# Patient Record
Sex: MALE | URBAN - METROPOLITAN AREA
[De-identification: names, ages, dates, MRNs, and addresses within clinical notes are randomized per-mention and may not be internally consistent; named-entity substitution may affect disease eponyms.]

---

## 2023-02-23 ENCOUNTER — APPOINTMENT (RX ONLY)
Dept: URBAN - METROPOLITAN AREA CLINIC 133 | Facility: CLINIC | Age: 28
Setting detail: DERMATOLOGY
End: 2023-02-23

## 2023-02-23 DIAGNOSIS — L21.8 OTHER SEBORRHEIC DERMATITIS: ICD-10-CM

## 2023-02-23 PROCEDURE — ? PRESCRIPTION

## 2023-02-23 PROCEDURE — 99203 OFFICE O/P NEW LOW 30 MIN: CPT

## 2023-02-23 PROCEDURE — ? COUNSELING

## 2023-02-23 PROCEDURE — ? TREATMENT REGIMEN

## 2023-02-23 RX ORDER — CLOBETASOL PROPIONATE 0.5 MG/G
GEL TOPICAL
Qty: 30 | Refills: 2 | Status: ERX | COMMUNITY
Start: 2023-02-23

## 2023-02-23 RX ADMIN — CLOBETASOL PROPIONATE: 0.5 GEL TOPICAL at 00:00

## 2023-02-23 ASSESSMENT — LOCATION DETAILED DESCRIPTION DERM
LOCATION DETAILED: RIGHT POSTERIOR NECK
LOCATION DETAILED: LEFT MEDIAL TRAPEZIAL NECK

## 2023-02-23 ASSESSMENT — LOCATION SIMPLE DESCRIPTION DERM: LOCATION SIMPLE: POSTERIOR NECK

## 2023-02-23 ASSESSMENT — LOCATION ZONE DERM: LOCATION ZONE: NECK

## 2023-02-23 NOTE — HPI: RASH
What Type Of Note Output Would You Prefer (Optional)?: Standard Output
How Severe Is Your Rash?: moderate
Is This A New Presentation, Or A Follow-Up?: Rash
Additional History: Patient states this started on his back, has now moved up his neck. Tried Selsun blue for area, since his aunt stayed his cousin had a similar issue and that helped, leaves on for a few minutes prior to washing off.  Patient states this brings some relief but drys skin out a lot.

## 2023-04-19 ENCOUNTER — APPOINTMENT (RX ONLY)
Dept: URBAN - METROPOLITAN AREA CLINIC 133 | Facility: CLINIC | Age: 28
Setting detail: DERMATOLOGY
End: 2023-04-19

## 2023-04-19 VITALS — HEIGHT: 76 IN | WEIGHT: 245 LBS

## 2023-04-19 DIAGNOSIS — L21.8 OTHER SEBORRHEIC DERMATITIS: ICD-10-CM | Status: IMPROVED

## 2023-04-19 DIAGNOSIS — L91.8 OTHER HYPERTROPHIC DISORDERS OF THE SKIN: ICD-10-CM

## 2023-04-19 DIAGNOSIS — B07.8 OTHER VIRAL WARTS: ICD-10-CM

## 2023-04-19 PROCEDURE — ? TREATMENT REGIMEN

## 2023-04-19 PROCEDURE — ? LIQUID NITROGEN

## 2023-04-19 PROCEDURE — ? ADDITIONAL NOTES

## 2023-04-19 PROCEDURE — 17110 DESTRUCTION B9 LES UP TO 14: CPT

## 2023-04-19 PROCEDURE — ? COUNSELING

## 2023-04-19 PROCEDURE — 99213 OFFICE O/P EST LOW 20 MIN: CPT | Mod: 25

## 2023-04-19 ASSESSMENT — LOCATION DETAILED DESCRIPTION DERM
LOCATION DETAILED: LEFT MEDIAL TRAPEZIAL NECK
LOCATION DETAILED: RIGHT POSTERIOR NECK
LOCATION DETAILED: LEFT AXILLARY VAULT
LOCATION DETAILED: LEFT MEDIAL MALAR CHEEK
LOCATION DETAILED: LEFT PROXIMAL DORSAL INDEX FINGER

## 2023-04-19 ASSESSMENT — LOCATION ZONE DERM
LOCATION ZONE: AXILLAE
LOCATION ZONE: NECK
LOCATION ZONE: FACE
LOCATION ZONE: FINGER

## 2023-04-19 ASSESSMENT — LOCATION SIMPLE DESCRIPTION DERM
LOCATION SIMPLE: LEFT INDEX FINGER
LOCATION SIMPLE: LEFT AXILLARY VAULT
LOCATION SIMPLE: POSTERIOR NECK
LOCATION SIMPLE: LEFT CHEEK

## 2023-04-19 NOTE — PROCEDURE: LIQUID NITROGEN
Medical Necessity Clause: This procedure was medically necessary because the lesions that were treated were:
Spray Paint Technique: No
Show Applicator Variable?: Yes
Spray Paint Text: The liquid nitrogen was applied to the skin utilizing a spray paint frosting technique.
Number Of Freeze-Thaw Cycles: 1 freeze-thaw cycle
Medical Necessity Information: It is in your best interest to select a reason for this procedure from the list below. All of these items fulfill various CMS LCD requirements except the new and changing color options.
Duration Of Freeze Thaw-Cycle (Seconds): 7
Post-Care Instructions: I reviewed with the patient in detail post-care instructions. Patient is to wear sunprotection, and avoid picking at any of the treated lesions. Pt may apply Vaseline to crusted or scabbing areas.
Detail Level: Detailed
Consent: The patient's consent was obtained including but not limited to risks of crusting, scabbing, blistering, scarring, darker or lighter pigmentary change, recurrence, incomplete removal and infection.

## 2023-04-19 NOTE — PROCEDURE: TREATMENT REGIMEN
Continue Regimen: Clobetasol gel, bid prn flares\\nSelsun katie shampoo
Detail Level: Zone
Samples Given: Cln scalp\\nHead and shoulders
Plan: Follow up once a year for refills

## 2023-04-19 NOTE — PROCEDURE: ADDITIONAL NOTES
Additional Notes: A total of 1 lesion was treated with liquid nitrogen for 1 freeze-thaw cycle lasting 7 seconds, located on the left axillary vault. The patient's consent was obtained including but not limited to risks of crusting, scabbing, blistering, scarring, darker or lighter pigmentary change, recurrence, incomplete removal and infection.
Detail Level: Simple
Render Risk Assessment In Note?: no

## 2023-06-13 ENCOUNTER — APPOINTMENT (RX ONLY)
Dept: URBAN - METROPOLITAN AREA CLINIC 133 | Facility: CLINIC | Age: 28
Setting detail: DERMATOLOGY
End: 2023-06-13

## 2023-06-13 VITALS — HEIGHT: 76 IN | WEIGHT: 250 LBS

## 2023-06-13 DIAGNOSIS — L91.8 OTHER HYPERTROPHIC DISORDERS OF THE SKIN: ICD-10-CM

## 2023-06-13 DIAGNOSIS — B07.8 OTHER VIRAL WARTS: ICD-10-CM

## 2023-06-13 PROBLEM — D23.5 OTHER BENIGN NEOPLASM OF SKIN OF TRUNK: Status: ACTIVE | Noted: 2023-06-13

## 2023-06-13 PROCEDURE — ? ADDITIONAL NOTES

## 2023-06-13 PROCEDURE — 99212 OFFICE O/P EST SF 10 MIN: CPT | Mod: 25

## 2023-06-13 PROCEDURE — ? COUNSELING

## 2023-06-13 PROCEDURE — 17110 DESTRUCTION B9 LES UP TO 14: CPT

## 2023-06-13 PROCEDURE — ? LIQUID NITROGEN

## 2023-06-13 ASSESSMENT — LOCATION ZONE DERM: LOCATION ZONE: FACE

## 2023-06-13 ASSESSMENT — LOCATION DETAILED DESCRIPTION DERM: LOCATION DETAILED: LEFT MEDIAL MALAR CHEEK

## 2023-06-13 ASSESSMENT — LOCATION SIMPLE DESCRIPTION DERM: LOCATION SIMPLE: LEFT CHEEK

## 2023-06-13 NOTE — PROCEDURE: ADDITIONAL NOTES
Additional Notes: A total of 1 lesion was treated with liquid nitrogen for 1 freeze-thaw cycle lasting 7 seconds, located on the right inguinal crease. The patient's consent was obtained including but not limited to risks of crusting, scabbing, blistering, scarring, darker or lighter pigmentary change, recurrence, incomplete removal and infection.
Render Risk Assessment In Note?: no
Detail Level: Simple